# Patient Record
Sex: MALE | Race: ASIAN | NOT HISPANIC OR LATINO | URBAN - METROPOLITAN AREA
[De-identification: names, ages, dates, MRNs, and addresses within clinical notes are randomized per-mention and may not be internally consistent; named-entity substitution may affect disease eponyms.]

---

## 2021-04-05 ENCOUNTER — OFFICE VISIT (OUTPATIENT)
Dept: URBAN - METROPOLITAN AREA CLINIC 37 | Facility: CLINIC | Age: 46
End: 2021-04-05

## 2021-04-05 ENCOUNTER — LAB OUTSIDE AN ENCOUNTER (OUTPATIENT)
Dept: URBAN - METROPOLITAN AREA CLINIC 37 | Facility: CLINIC | Age: 46
End: 2021-04-05

## 2021-04-05 VITALS — WEIGHT: 164 LBS | BODY MASS INDEX: 25.74 KG/M2 | HEIGHT: 67 IN

## 2021-04-05 RX ORDER — SUCRALFATE 1 G/10ML
10 ML ON AN EMPTY STOMACH BEFORE MEALS SUSPENSION ORAL TWICE A DAY
Qty: 420 ML | Refills: 1 | OUTPATIENT
Start: 2021-04-05

## 2021-04-05 NOTE — HPI-MIGRATED HPI
Initial consultation : Patient is here for -> Dysphagia Onset of symptoms: 2 years ago after he had tonsilectomy. Recently he felt symptoms again ( ~ 1 week) Characteristics: painful swallowing and difficulty in swallowing with solids, and even with liquids Aggravating factors: none Associated syptoms: none Relieving factors: none Medications tried: none Tests/evaluations done previously: Denies prior EGD or Colonoscopy Family history of GI malignancy: denies;

## 2021-04-12 ENCOUNTER — OFFICE VISIT (OUTPATIENT)
Dept: URBAN - METROPOLITAN AREA MEDICAL CENTER 10 | Facility: MEDICAL CENTER | Age: 46
End: 2021-04-12

## 2021-04-19 ENCOUNTER — TELEPHONE ENCOUNTER (OUTPATIENT)
Dept: URBAN - METROPOLITAN AREA CLINIC 35 | Facility: CLINIC | Age: 46
End: 2021-04-19

## 2021-04-20 ENCOUNTER — OFFICE VISIT (OUTPATIENT)
Dept: URBAN - METROPOLITAN AREA CLINIC 37 | Facility: CLINIC | Age: 46
End: 2021-04-20

## 2021-04-20 VITALS
BODY MASS INDEX: 24.96 KG/M2 | HEIGHT: 67 IN | DIASTOLIC BLOOD PRESSURE: 82 MMHG | SYSTOLIC BLOOD PRESSURE: 118 MMHG | TEMPERATURE: 98.8 F | HEART RATE: 97 BPM | WEIGHT: 159 LBS

## 2021-04-20 PROBLEM — 6185008: Status: ACTIVE | Noted: 2021-04-20

## 2021-04-20 PROBLEM — 12063002: Status: ACTIVE | Noted: 2021-04-20

## 2021-04-20 PROBLEM — 438759003: Status: ACTIVE | Noted: 2021-04-05

## 2021-04-20 PROBLEM — 40739000: Status: ACTIVE | Noted: 2021-04-05

## 2021-04-20 RX ORDER — OMEPRAZOLE 40 MG/1
1 CAPSULE CAPSULE, DELAYED RELEASE ORAL
Qty: 28 | Refills: 0 | OUTPATIENT
Start: 2021-04-20

## 2021-04-20 RX ORDER — AMOXICILLIN 500 MG/1
2 CAPSULE CAPSULE ORAL TWICE A DAY
Qty: 56 CAPSULE | Refills: 0 | OUTPATIENT
Start: 2021-04-20

## 2021-04-20 RX ORDER — SUCRALFATE 1 G/10ML
10 ML ON AN EMPTY STOMACH BEFORE MEALS SUSPENSION ORAL TWICE A DAY
Qty: 420 ML | Refills: 1 | Status: ON HOLD | COMMUNITY
Start: 2021-04-05

## 2021-04-20 RX ORDER — LEVOFLOXACIN 500 MG/1
1 TABLET TABLET, FILM COATED ORAL ONCE A DAY
Qty: 14 TABLET | Refills: 0 | OUTPATIENT
Start: 2021-04-20

## 2021-04-20 RX ORDER — SUCRALFATE 1 G/10ML
10 ML ON AN EMPTY STOMACH BEFORE MEALS SUSPENSION ORAL TWICE A DAY
Qty: 420 ML | Refills: 1 | OUTPATIENT

## 2021-04-20 NOTE — EXAM-MIGRATED EXAMINATIONS
GENERAL APPEARANCE: - alert, well hydrated, in no distress ;   RECTAL: -  ( MA presented in room during examination): normal tone, no external hemorrhoids, no red blood, no fissure bleeding, no blood note on fecal smear to the paper ;

## 2021-04-20 NOTE — HPI-MIGRATED HPI
Acute visit : Patient is here for an acute visit -> rectal bleeding Onset of Sx: 2 days ago ( since Sunday night) Characteristics: BRB dripping (x2) into the toilet while having BMs. He denies any unusual foods intake  that may lead to BRB He had 2 BMs with BRB in toilet that night. BM on Monday and today ( Tuesday ) were normal with yellow, brown color Patient denied any other related symptoms.  Medications tried: none ;   Post-op OV : After EGD on -> 04/12/2021 due to dysphagia, GE junction was irregular with esophagus bxx showing minimal chronic inflammation. The adenoids are moderate in size. Erythematous mucosa were noted in the gastric body and antrum. Gastric bxx showed chronic active gastritis and positive for H. pylori; negative for IM. Normal duodenum;   Post-op OV : Patient denies -> rectal bleeding fever abdominal pain nausea & vomiting since the procedure, except the acute change as below;   Post-op OV : Patient -> denies any new changes in his/her health status since last OV.  Patient was prescribed with Carafate 1gm BID in last visit;   Post-op OV : Patient reports of current symptoms -> improving;

## 2021-05-10 ENCOUNTER — OFFICE VISIT (OUTPATIENT)
Dept: URBAN - METROPOLITAN AREA CLINIC 37 | Facility: CLINIC | Age: 46
End: 2021-05-10

## 2021-06-14 ENCOUNTER — DASHBOARD ENCOUNTERS (OUTPATIENT)
Age: 46
End: 2021-06-14

## 2021-06-14 ENCOUNTER — LAB OUTSIDE AN ENCOUNTER (OUTPATIENT)
Dept: URBAN - METROPOLITAN AREA CLINIC 37 | Facility: CLINIC | Age: 46
End: 2021-06-14

## 2021-06-14 ENCOUNTER — OFFICE VISIT (OUTPATIENT)
Dept: URBAN - METROPOLITAN AREA CLINIC 37 | Facility: CLINIC | Age: 46
End: 2021-06-14

## 2021-06-14 VITALS
OXYGEN SATURATION: 97 % | TEMPERATURE: 97 F | HEART RATE: 70 BPM | SYSTOLIC BLOOD PRESSURE: 116 MMHG | BODY MASS INDEX: 24.96 KG/M2 | WEIGHT: 159 LBS | DIASTOLIC BLOOD PRESSURE: 74 MMHG | HEIGHT: 67 IN

## 2021-06-14 PROBLEM — 8493009: Status: ACTIVE | Noted: 2021-06-14

## 2021-06-14 RX ORDER — LEVOFLOXACIN 500 MG/1
1 TABLET TABLET, FILM COATED ORAL ONCE A DAY
Qty: 14 TABLET | Refills: 0 | Status: DISCONTINUED | COMMUNITY
Start: 2021-04-20

## 2021-06-14 RX ORDER — AMOXICILLIN 500 MG/1
2 CAPSULE CAPSULE ORAL TWICE A DAY
Qty: 56 CAPSULE | Refills: 0 | Status: DISCONTINUED | COMMUNITY
Start: 2021-04-20

## 2021-06-14 RX ORDER — SUCRALFATE 1 G/10ML
10 ML ON AN EMPTY STOMACH BEFORE MEALS SUSPENSION ORAL TWICE A DAY
Qty: 420 ML | Refills: 1 | Status: DISCONTINUED | COMMUNITY

## 2021-06-14 RX ORDER — OMEPRAZOLE 40 MG/1
1 CAPSULE CAPSULE, DELAYED RELEASE ORAL
Qty: 28 | Refills: 0 | Status: DISCONTINUED | COMMUNITY
Start: 2021-04-20

## 2021-06-14 NOTE — HPI-MIGRATED HPI
Follow up OV : Patient is here for a routine OV for -> H. pylori/dysphagia, rectal bleeding;   Follow up OV : Last OV was -> 6 weeks ago H. pylori/Dysphagia:  EGD on 04/12/2021 showed chronic active gastritis and positive for H. pylori.  Subsequently, was prescribed with Amoxicillin 500mg BID + Levaquin 500mg QD + Omeprazole 40mg BID x 14 days. Despite the instruction in Romansh was given to the patient on how to take med, he misunderstood the instruction and took Amoxicillin 2 tabs once daily instead of 2 tabs BID Pt also takes Carafate 1gm BID for dysphagia and he was advised to f/u with his previous ENT provider Current sx: improving  Rectal bleeding:  Patient reported rectal bleeding after EGD procedure on 04/12/2021 Therefore, was advised to take stool softeners OTC but he did not use it bc of resolving of the symptoms Current BM: one soft BM daily;

## 2021-06-15 LAB — RESULT:: NOT DETECTED

## 2021-06-29 ENCOUNTER — TELEPHONE ENCOUNTER (OUTPATIENT)
Dept: URBAN - METROPOLITAN AREA CLINIC 35 | Facility: CLINIC | Age: 46
End: 2021-06-29